# Patient Record
Sex: FEMALE | Race: WHITE | NOT HISPANIC OR LATINO | URBAN - METROPOLITAN AREA
[De-identification: names, ages, dates, MRNs, and addresses within clinical notes are randomized per-mention and may not be internally consistent; named-entity substitution may affect disease eponyms.]

---

## 2024-03-05 ENCOUNTER — APPOINTMENT (RX ONLY)
Dept: URBAN - METROPOLITAN AREA CLINIC 75 | Facility: CLINIC | Age: 59
Setting detail: DERMATOLOGY
End: 2024-03-05

## 2024-03-05 DIAGNOSIS — I78.1 NEVUS, NON-NEOPLASTIC: ICD-10-CM

## 2024-03-05 PROCEDURE — ? COUNSELING

## 2024-03-05 PROCEDURE — ? VBEAM PRIMA LASER

## 2024-03-05 ASSESSMENT — LOCATION SIMPLE DESCRIPTION DERM: LOCATION SIMPLE: NOSE

## 2024-03-05 ASSESSMENT — LOCATION DETAILED DESCRIPTION DERM
LOCATION DETAILED: NASAL SUPRATIP
LOCATION DETAILED: NASAL TIP

## 2024-03-05 ASSESSMENT — LOCATION ZONE DERM: LOCATION ZONE: NOSE

## 2024-03-05 NOTE — PROCEDURE: VBEAM PRIMA LASER
Cryogen Time (Ms): 30
Detail Level: Zone
Spot Size: 7 mm
Delay Time (Ms): 20
Comments: #, nm, J, ms, mm, cnt\\[id number], 595,17.00,10.00,3.5,1\\[id number], 595,11.00,0.45,3.5,2
Post-Procedure Care: Ice applied. \\nSunscreen recommended
Consent: Written consent obtained, risks reviewed including but not limited to crusting, scabbing, blistering, scarring, darker or lighter pigmentary change, incidental hair removal, bruising, and/or incomplete removal.
Wavelength: 595 nm
Spot Size: 15 mm
Post-Care Instructions: I reviewed with the patient in detail post-care instructions. \\nPatient should wear sun protection until treated areas are fully healed
Spot Size: 14 mm

## 2024-03-05 NOTE — PROCEDURE: COUNSELING
Detail Level: Zone
Patient Specific Counseling (Will Not Stick From Patient To Patient): Spider Angiomas are benign vascular lesions not associated with underlying pathology\\nMay spontaneously involute within months to years.\\nVBEAM Prima Pulsed-Dye laser tx was discussed